# Patient Record
Sex: FEMALE | Race: BLACK OR AFRICAN AMERICAN | NOT HISPANIC OR LATINO | Employment: FULL TIME | URBAN - METROPOLITAN AREA
[De-identification: names, ages, dates, MRNs, and addresses within clinical notes are randomized per-mention and may not be internally consistent; named-entity substitution may affect disease eponyms.]

---

## 2022-10-17 ENCOUNTER — OFFICE VISIT (OUTPATIENT)
Dept: OBSTETRICS AND GYNECOLOGY | Facility: CLINIC | Age: 32
End: 2022-10-17
Payer: COMMERCIAL

## 2022-10-17 VITALS
HEART RATE: 80 BPM | WEIGHT: 164.19 LBS | BODY MASS INDEX: 34.47 KG/M2 | HEIGHT: 58 IN | DIASTOLIC BLOOD PRESSURE: 80 MMHG | SYSTOLIC BLOOD PRESSURE: 126 MMHG

## 2022-10-17 DIAGNOSIS — Z01.419 WELL WOMAN EXAM WITH ROUTINE GYNECOLOGICAL EXAM: Primary | ICD-10-CM

## 2022-10-17 DIAGNOSIS — Z00.00 LABORATORY EXAM ORDERED AS PART OF ROUTINE GENERAL MEDICAL EXAMINATION: ICD-10-CM

## 2022-10-17 DIAGNOSIS — R10.32 ABDOMINAL PAIN, LLQ: ICD-10-CM

## 2022-10-17 PROCEDURE — 1159F PR MEDICATION LIST DOCUMENTED IN MEDICAL RECORD: ICD-10-PCS | Mod: CPTII,S$GLB,, | Performed by: OBSTETRICS & GYNECOLOGY

## 2022-10-17 PROCEDURE — 99385 PR PREVENTIVE VISIT,NEW,18-39: ICD-10-PCS | Mod: S$GLB,,, | Performed by: OBSTETRICS & GYNECOLOGY

## 2022-10-17 PROCEDURE — 3079F PR MOST RECENT DIASTOLIC BLOOD PRESSURE 80-89 MM HG: ICD-10-PCS | Mod: CPTII,S$GLB,, | Performed by: OBSTETRICS & GYNECOLOGY

## 2022-10-17 PROCEDURE — 1159F MED LIST DOCD IN RCRD: CPT | Mod: CPTII,S$GLB,, | Performed by: OBSTETRICS & GYNECOLOGY

## 2022-10-17 PROCEDURE — 99385 PREV VISIT NEW AGE 18-39: CPT | Mod: S$GLB,,, | Performed by: OBSTETRICS & GYNECOLOGY

## 2022-10-17 PROCEDURE — 3074F SYST BP LT 130 MM HG: CPT | Mod: CPTII,S$GLB,, | Performed by: OBSTETRICS & GYNECOLOGY

## 2022-10-17 PROCEDURE — 3079F DIAST BP 80-89 MM HG: CPT | Mod: CPTII,S$GLB,, | Performed by: OBSTETRICS & GYNECOLOGY

## 2022-10-17 PROCEDURE — 3074F PR MOST RECENT SYSTOLIC BLOOD PRESSURE < 130 MM HG: ICD-10-PCS | Mod: CPTII,S$GLB,, | Performed by: OBSTETRICS & GYNECOLOGY

## 2022-10-17 NOTE — PROGRESS NOTES
"Lisa Livingston is a 32 y.o. female  who presents for a well woman exam.  She has no issues, problems, or complaints. She has heavy, painful periods but takes ibuprofen or tylenol with relief. It lasts 5-6 days. This past year skipped a few periods.     History reviewed. No pertinent past medical history.    Past Surgical History:   Procedure Laterality Date    WISDOM TOOTH EXTRACTION         OB History    Para Term  AB Living   0 0 0 0 0 0   SAB IAB Ectopic Multiple Live Births   0 0 0 0 0       Family History   Problem Relation Age of Onset    No Known Problems Father     Asthma Mother     Lung cancer Maternal Aunt        Social History     Tobacco Use    Smoking status: Never     Passive exposure: Never    Smokeless tobacco: Never   Substance Use Topics    Alcohol use: Yes     Comment: "very seldom"    Drug use: Never       No current outpatient medications on file.     Review of patient's allergies indicates:  No Known Allergies     ROS:  GENERAL: Denies weight gain or weight loss. Feeling well overall.   SKIN: Denies rash or lesions.   HEAD: Denies head injury or headache.   NODES: Denies enlarged lymph nodes.   CHEST: Denies shortness of breath.   CARDIOVASCULAR: Denies palpitations or chest pain.   ABDOMEN: Denies abdominal pain, constipation, diarrhea, nausea, vomiting or rectal bleeding.   URINARY: Denies frequency, dysuria, hematuria, or burning on urination.  REPRODUCTIVE: See HPI.   BREASTS: Denies pain, lumps, or nipple discharge.   HEMATOLOGIC: Denies easy bruisability or excessive bleeding.  MUSCULOSKELETAL: Denies joint pain or swelling.   NEUROLOGIC: Denies syncope or weakness.   PSYCHIATRIC: Denies depression, anxiety or mood swings.    PHYSICAL EXAM:    /80 (BP Location: Left arm, Patient Position: Sitting, BP Method: Large (Manual))   Pulse 80   Ht 4' 10" (1.473 m)   Wt 74.5 kg (164 lb 3.2 oz)   LMP 10/06/2022 (Exact Date) Comment: periods are irregular  BMI 34.32 " kg/m²    Body mass index is 34.32 kg/m².     APPEARANCE: Well nourished, well developed, in no acute distress.  AFFECT: WNL, alert and oriented x 3  SKIN: No acne or hirsutism  NECK: Neck symmetric without masses or thyromegaly  NODES: No inguinal, cervical, axillary, or femoral lymph node enlargement  CHEST: Good respiratory effect  ABDOMEN: Soft.  No tenderness or masses.  No hepatosplenomegaly.  No hernias.  BREASTS: Symmetrical, no skin changes or visible lesions.  No palpable masses, nipple discharge bilaterally.  PELVIC: Normal external genitalia without lesions.  Normal hair distribution.  Adequate perineal body, normal urethral meatus.  Urethra and bladder without tenderness or masses. Vagina moist and well rugated without lesions or discharge.  Cervix pink, without lesions, discharge or tenderness.  No significant cystocele or rectocele.  Bimanual exam shows uterus to be normal size, regular, mobile and nontender.  Adnexa with left fullness and tenderness.    EXTREMITIES: No edema.     Well woman exam with routine gynecological exam  -     Liquid-based pap smear, screening    Laboratory exam ordered as part of routine general medical examination  -     CBC Auto Differential; Future; Expected date: 10/24/2022  -     Comprehensive Metabolic Panel; Future; Expected date: 10/24/2022  -     Lipid Panel; Future; Expected date: 10/24/2022  -     TSH; Future; Expected date: 10/24/2022  -     T4, Free; Future; Expected date: 10/24/2022    Abdominal pain, LLQ  -     US OB/GYN Procedure ([facility]); Future       Patient was counseled today on A.C.S. Pap guidelines and recommendations for yearly pelvic exams, mammograms and monthly self breast exams; to see her PCP for other health maintenance.

## 2022-10-20 ENCOUNTER — PROCEDURE VISIT (OUTPATIENT)
Dept: OBSTETRICS AND GYNECOLOGY | Facility: CLINIC | Age: 32
End: 2022-10-20
Payer: COMMERCIAL

## 2022-10-20 DIAGNOSIS — R10.32 ABDOMINAL PAIN, LLQ: ICD-10-CM

## 2022-10-21 LAB — Lab: NORMAL

## 2022-10-25 ENCOUNTER — TELEPHONE (OUTPATIENT)
Dept: OBSTETRICS AND GYNECOLOGY | Facility: CLINIC | Age: 32
End: 2022-10-25
Payer: COMMERCIAL

## 2022-10-25 NOTE — TELEPHONE ENCOUNTER
----- Message from Deborah Doyle MD sent at 10/25/2022 11:12 AM CDT -----  Please notify pt that US shows a simple cyst on the left ovary which should resolve on its own. If she is  she can do a cycle of OCP's

## 2022-11-08 ENCOUNTER — TELEPHONE (OUTPATIENT)
Dept: OBSTETRICS AND GYNECOLOGY | Facility: CLINIC | Age: 32
End: 2022-11-08
Payer: COMMERCIAL

## 2022-11-08 LAB
ABS NRBC COUNT: 0 X 10 3/UL (ref 0–0.01)
ABSOLUTE BASOPHIL: 0.05 X 10 3/UL (ref 0–0.22)
ABSOLUTE EOSINOPHIL: 0.12 X 10 3/UL (ref 0.04–0.54)
ABSOLUTE IMMATURE GRAN: 0.03 X 10 3/UL (ref 0–0.04)
ABSOLUTE LYMPHOCYTE: 3.02 X 10 3/UL (ref 0.86–4.75)
ABSOLUTE MONOCYTE: 0.55 X 10 3/UL (ref 0.22–1.08)
ALBUMIN SERPL-MCNC: 4.1 G/DL (ref 3.5–5.2)
ALBUMIN/GLOB SERPL ELPH: 1.4 {RATIO} (ref 1–2.7)
ALP ISOS SERPL LEV INH-CCNC: 64 U/L (ref 35–105)
ALT (SGPT): 11 U/L (ref 0–33)
ANION GAP SERPL CALC-SCNC: 8 MMOL/L (ref 8–17)
AST SERPL-CCNC: 13 U/L (ref 0–32)
BASOPHILS NFR BLD: 0.7 % (ref 0.2–1.2)
BILIRUBIN, TOTAL: 0.37 MG/DL (ref 0–1.2)
BUN/CREAT SERPL: 10.3 (ref 6–20)
CALCIUM SERPL-MCNC: 8.9 MG/DL (ref 8.6–10.2)
CARBON DIOXIDE, CO2: 25 MMOL/L (ref 22–29)
CHLORIDE: 105 MMOL/L (ref 98–107)
CHOLEST SERPL-MSCNC: 168 MG/DL (ref 100–200)
CREAT SERPL-MCNC: 0.76 MG/DL (ref 0.5–0.9)
EOSINOPHIL NFR BLD: 1.7 % (ref 0.7–7)
GFR ESTIMATION: 106.7
GLOBULIN: 2.9 G/DL (ref 1.5–4.5)
GLUCOSE: 91 MG/DL (ref 74–106)
HCT VFR BLD AUTO: 37.5 % (ref 37–47)
HDLC SERPL-MCNC: 44 MG/DL
HGB BLD-MCNC: 12.2 G/DL (ref 12–16)
IMMATURE GRANULOCYTES: 0.4 % (ref 0–0.5)
LDL/HDL RATIO: 2.5 (ref 1–3)
LDLC SERPL CALC-MCNC: 110.6 MG/DL (ref 0–100)
LYMPHOCYTES NFR BLD: 42.2 % (ref 19.3–53.1)
MCH RBC QN AUTO: 27.9 PG (ref 27–32)
MCHC RBC AUTO-ENTMCNC: 32.5 G/DL (ref 32–36)
MCV RBC AUTO: 85.8 FL (ref 82–100)
MONOCYTES NFR BLD: 7.7 % (ref 4.7–12.5)
NEUTROPHILS # BLD AUTO: 3.38 X 10 3/UL (ref 2.15–7.56)
NEUTROPHILS NFR BLD: 47.3 % (ref 34–71.1)
NUCLEATED RED BLOOD CELLS: 0 /100 WBC (ref 0–0.2)
PLATELET # BLD AUTO: 327 X 10 3/UL (ref 135–400)
POTASSIUM: 4.5 MMOL/L (ref 3.5–5.1)
PROT SNV-MCNC: 7 G/DL (ref 6.4–8.3)
RBC # BLD AUTO: 4.37 X 10 6/UL (ref 4.2–5.4)
RDW-SD: 43.4 FL (ref 37–54)
SODIUM: 138 MMOL/L (ref 136–145)
T4, FREE: 1.24 NG/DL (ref 0.93–1.7)
TRIGL SERPL-MCNC: 67 MG/DL (ref 0–150)
TSH SERPL DL<=0.005 MIU/L-ACNC: 1.81 UIU/ML (ref 0.27–4.2)
UREA NITROGEN (BUN): 7.8 MG/DL (ref 6–20)
WBC # BLD: 7.15 X 10 3/UL (ref 4.3–10.8)

## 2022-11-08 NOTE — TELEPHONE ENCOUNTER
----- Message from Deborah Doyle MD sent at 11/8/2022  4:54 PM CST -----  Please notify patient of normal lab results.

## 2022-12-05 ENCOUNTER — TELEPHONE (OUTPATIENT)
Dept: OBSTETRICS AND GYNECOLOGY | Facility: CLINIC | Age: 32
End: 2022-12-05
Payer: COMMERCIAL

## 2022-12-05 DIAGNOSIS — N83.209 CYST OF OVARY, UNSPECIFIED LATERALITY: Primary | ICD-10-CM

## 2022-12-05 NOTE — TELEPHONE ENCOUNTER
----- Message from Urmila Rene MA sent at 12/5/2022  8:06 AM CST -----  Contact: SELF    ----- Message -----  From: Yolis Faulkner  Sent: 12/2/2022   4:32 PM CST  To: [facility] Staff    Type:  Needs Medical Advice    Who Called: Lisa Livingston    Concern - Patient was given samples of birth control 1 month ago and wants to know if Dr. Doyle wants to continue on them or stop - please let patient know @ [phone] - thanks!     (I tried to call clinic, but no answer)

## 2022-12-06 ENCOUNTER — PROCEDURE VISIT (OUTPATIENT)
Dept: OBSTETRICS AND GYNECOLOGY | Facility: CLINIC | Age: 32
End: 2022-12-06
Payer: COMMERCIAL

## 2022-12-06 DIAGNOSIS — N83.209 CYST OF OVARY, UNSPECIFIED LATERALITY: ICD-10-CM

## 2022-12-06 PROCEDURE — 76856 US EXAM PELVIC COMPLETE: CPT | Mod: S$GLB,,, | Performed by: OBSTETRICS & GYNECOLOGY

## 2022-12-06 PROCEDURE — 76856 US OB/GYN PROCEDURE (VIEWPOINT): ICD-10-PCS | Mod: S$GLB,,, | Performed by: OBSTETRICS & GYNECOLOGY
